# Patient Record
Sex: FEMALE | URBAN - METROPOLITAN AREA
[De-identification: names, ages, dates, MRNs, and addresses within clinical notes are randomized per-mention and may not be internally consistent; named-entity substitution may affect disease eponyms.]

---

## 2019-11-20 ENCOUNTER — APPOINTMENT (OUTPATIENT)
Age: 54
Setting detail: DERMATOLOGY
End: 2019-11-20

## 2019-11-20 VITALS — HEIGHT: 64 IN | RESPIRATION RATE: 16 BRPM | WEIGHT: 165 LBS

## 2019-11-20 DIAGNOSIS — L82.1 OTHER SEBORRHEIC KERATOSIS: ICD-10-CM

## 2019-11-20 DIAGNOSIS — L40.0 PSORIASIS VULGARIS: ICD-10-CM

## 2019-11-20 PROCEDURE — 99203 OFFICE O/P NEW LOW 30 MIN: CPT

## 2019-11-20 PROCEDURE — OTHER LIQUID NITROGEN (COSMETIC): OTHER

## 2019-11-20 PROCEDURE — OTHER COUNSELING: OTHER

## 2019-11-20 PROCEDURE — OTHER PRESCRIPTION: OTHER

## 2019-11-20 RX ORDER — CALCIPOTRIENE 50 UG/G
0.005% CREAM TOPICAL BID
Qty: 1 | Refills: 11 | Status: ERX | COMMUNITY
Start: 2019-11-20

## 2019-11-20 RX ORDER — TAZAROTENE 1 MG/G
0.1% CREAM TOPICAL QHS
Qty: 1 | Refills: 11 | Status: ERX | COMMUNITY
Start: 2019-11-20

## 2019-11-20 RX ORDER — CLOBETASOL PROPIONATE 0.5 MG/G
0.05% CREAM TOPICAL BID
Qty: 1 | Refills: 4 | Status: ERX | COMMUNITY
Start: 2019-11-20

## 2019-11-20 ASSESSMENT — LOCATION DETAILED DESCRIPTION DERM
LOCATION DETAILED: RIGHT RIB CAGE
LOCATION DETAILED: RIGHT DISTAL PRETIBIAL REGION
LOCATION DETAILED: LEFT RIB CAGE
LOCATION DETAILED: RIGHT ANTERIOR DISTAL THIGH
LOCATION DETAILED: LEFT DISTAL PRETIBIAL REGION
LOCATION DETAILED: RIGHT RADIAL DORSAL HAND
LOCATION DETAILED: RIGHT RIB CAGE
LOCATION DETAILED: LEFT DORSAL MIDDLE METACARPOPHALANGEAL JOINT
LOCATION DETAILED: LEFT ANTERIOR DISTAL THIGH
LOCATION DETAILED: LEFT RIB CAGE

## 2019-11-20 ASSESSMENT — LOCATION ZONE DERM
LOCATION ZONE: HAND
LOCATION ZONE: LEG
LOCATION ZONE: TRUNK
LOCATION ZONE: TRUNK

## 2019-11-20 ASSESSMENT — LOCATION SIMPLE DESCRIPTION DERM
LOCATION SIMPLE: RIGHT PRETIBIAL REGION
LOCATION SIMPLE: LEFT THIGH
LOCATION SIMPLE: ABDOMEN
LOCATION SIMPLE: LEFT PRETIBIAL REGION
LOCATION SIMPLE: RIGHT HAND
LOCATION SIMPLE: RIGHT THIGH
LOCATION SIMPLE: LEFT HAND
LOCATION SIMPLE: ABDOMEN

## 2019-11-20 NOTE — HPI: RASH
How Severe Is Your Rash?: moderate
Is This A New Presentation, Or A Follow-Up?: Rash
Additional History: This came back 2 years ago. She itches to point of bleeding at times. Has used prescription clobetasol desonide creams that did not help in past - used for 6 months. Now using goat milk lotion.  Gets joint pains with right knee and left ankles. Worse with weather changes. Does not change throughout the day.  Has lower back pain with muscle spasms that gets worse throughout the day and improves with muscle relaxants.

## 2019-11-20 NOTE — PROCEDURE: LIQUID NITROGEN (COSMETIC)
Detail Level: Detailed
Post-Care Instructions: - Patient was instructed to avoid picking at any of the treated lesions.
Render Post-Care Instructions In Note?: yes
Price (Use Numbers Only, No Special Characters Or $): 100.00
Consent: - Verbal and written consent was obtained, and risks were reviewed prior to procedure today. \\n- Risks discussed include but are not limited to pain, crusting, scabbing, blistering, scarring, temporary or permanent darker or lighter pigmentary change, recurrence, incomplete resolution, and infection. \\n- The patient understands that the procedure is cosmetic in nature and is not covered by or billable to insurance.

## 2019-11-20 NOTE — PROCEDURE: COUNSELING
Detail Level: Simple
Patient Specific Counseling (Will Not Stick From Patient To Patient): Recommended trying to manage this with the use of topical medicines alone. If this fails, would consider systemic treatment in the future. Advised that her joint symptoms do not suggest psoriatic arthritis at this time. Discussed comorbidities with psoriasis. Discussed and recommended clobetasol cream twice per day until resolved for never longer than 14 days per month. Also recommended has tazarotene 0.1% cream qhs as tolerated and calcipotriene 0.05% bid. Patient expressed understanding. ASPN used for tazarotene. Follow up in eight weeks if not under excellent control or in one year if well-maintained. Patient expressed understanding.

## 2021-02-17 ENCOUNTER — APPOINTMENT (OUTPATIENT)
Dept: URBAN - METROPOLITAN AREA CLINIC 252 | Age: 56
Setting detail: DERMATOLOGY
End: 2021-02-21

## 2021-02-17 VITALS — RESPIRATION RATE: 16 BRPM | HEIGHT: 64 IN | WEIGHT: 165 LBS

## 2021-02-17 DIAGNOSIS — L40.0 PSORIASIS VULGARIS: ICD-10-CM

## 2021-02-17 PROCEDURE — OTHER COUNSELING: OTHER

## 2021-02-17 PROCEDURE — OTHER PRESCRIPTION: OTHER

## 2021-02-17 PROCEDURE — OTHER ADDITIONAL NOTES: OTHER

## 2021-02-17 PROCEDURE — 99213 OFFICE O/P EST LOW 20 MIN: CPT

## 2021-02-17 RX ORDER — CALCIPOTRIENE 0.05 MG/G
0.005% CREAM TOPICAL BID
Qty: 1 | Refills: 5 | Status: ERX

## 2021-02-17 RX ORDER — CLOBETASOL PROPIONATE 0.5 MG/G
0.05% CREAM TOPICAL BID
Qty: 1 | Refills: 5 | Status: ERX

## 2021-02-17 ASSESSMENT — LOCATION ZONE DERM
LOCATION ZONE: HAND
LOCATION ZONE: AXILLAE
LOCATION ZONE: LEG

## 2021-02-17 ASSESSMENT — LOCATION SIMPLE DESCRIPTION DERM
LOCATION SIMPLE: RIGHT AXILLARY VAULT
LOCATION SIMPLE: RIGHT HAND
LOCATION SIMPLE: RIGHT PRETIBIAL REGION
LOCATION SIMPLE: LEFT AXILLARY VAULT
LOCATION SIMPLE: LEFT HAND
LOCATION SIMPLE: LEFT PRETIBIAL REGION

## 2021-02-17 ASSESSMENT — LOCATION DETAILED DESCRIPTION DERM
LOCATION DETAILED: LEFT RADIAL DORSAL HAND
LOCATION DETAILED: LEFT DISTAL PRETIBIAL REGION
LOCATION DETAILED: LEFT AXILLARY VAULT
LOCATION DETAILED: RIGHT RADIAL DORSAL HAND
LOCATION DETAILED: RIGHT DISTAL PRETIBIAL REGION
LOCATION DETAILED: RIGHT AXILLARY VAULT

## 2021-02-17 NOTE — PROCEDURE: COUNSELING
Patient Specific Counseling (Will Not Stick From Patient To Patient): Recommended trying to manage this with the use of topical medicines alone. If this fails, would consider systemic treatment in the future. Advised that her joint symptoms do not suggest psoriatic arthritis at this time. Discussed comorbidities with psoriasis. Discussed and recommended clobetasol cream twice per day until resolved for never longer than 14 days per month. Also recommended has tazarotene 0.1% cream qhs as tolerated and calcipotriene 0.05% bid. Patient expressed understanding. ASPN used for tazarotene. Follow up in eight weeks if not under excellent control or in one year if well-maintained. Patient expressed understanding.
Detail Level: Simple

## 2021-02-17 NOTE — PROCEDURE: ADDITIONAL NOTES
Detail Level: Simple
Additional Notes: Recommended to start taking a vit d supplement and to do light treatment. Advised pt to wrap extremities occlusion therapy after applying steroid for a couple hours. discussed with pt if topicals stop working to consider starting a biologic.  Eucerin rough spot relief samples provided.
Render Risk Assessment In Note?: no